# Patient Record
Sex: FEMALE | Race: BLACK OR AFRICAN AMERICAN | Employment: FULL TIME | URBAN - METROPOLITAN AREA
[De-identification: names, ages, dates, MRNs, and addresses within clinical notes are randomized per-mention and may not be internally consistent; named-entity substitution may affect disease eponyms.]

---

## 2018-10-08 ENCOUNTER — HOSPITAL ENCOUNTER (OUTPATIENT)
Age: 48
Discharge: HOME | End: 2018-10-08
Payer: MEDICAID

## 2018-10-08 VITALS
BODY MASS INDEX: 32.95 KG/M2 | SYSTOLIC BLOOD PRESSURE: 133 MMHG | DIASTOLIC BLOOD PRESSURE: 86 MMHG | HEIGHT: 66 IN | OXYGEN SATURATION: 98 % | HEART RATE: 72 BPM | WEIGHT: 205 LBS | TEMPERATURE: 98 F | RESPIRATION RATE: 18 BRPM

## 2018-10-08 DIAGNOSIS — T78.40XA ALLERGIC REACTION, INITIAL ENCOUNTER: ICD-10-CM

## 2018-10-08 DIAGNOSIS — B02.9 HERPES ZOSTER WITHOUT COMPLICATION: Primary | ICD-10-CM

## 2018-10-08 PROCEDURE — 99203 OFFICE O/P NEW LOW 30 MIN: CPT

## 2018-10-08 RX ORDER — METHYLPREDNISOLONE 4 MG/1
TABLET ORAL
Qty: 1 PACKAGE | Refills: 0 | Status: SHIPPED | OUTPATIENT
Start: 2018-10-08

## 2018-10-08 RX ORDER — VALACYCLOVIR HYDROCHLORIDE 1 G/1
1 TABLET, FILM COATED ORAL 3 TIMES DAILY
Qty: 21 TABLET | Refills: 0 | Status: SHIPPED | OUTPATIENT
Start: 2018-10-08 | End: 2018-10-15

## 2018-10-08 NOTE — ED PROVIDER NOTES
Patient Seen in: THE [facility]mediate Care In [facility] END    History   Patient presents with:  Rash    Stated Complaint: RASH    HPI    78-year-old female who comes in today complaining of a rash to the right upper chest and one section of her forearm that s Neurological: She is alert and oriented to person, place, and time. She has normal reflexes. She exhibits normal muscle tone. Coordination normal.   Skin: Skin is warm and dry. Rash noted. Rash is vesicular. She is not diaphoretic. No erythema. No pallor. I have given the patient instructions regarding her diagnosis, expectations, follow up, and return to the ER precautions.   I explained to the patient that emergent conditions may arise to return to the immediate care or ER for new, worsening or any persist

## 2019-04-16 ENCOUNTER — HOSPITAL ENCOUNTER (OUTPATIENT)
Age: 49
Discharge: HOME | End: 2019-04-16
Payer: MEDICAID

## 2019-04-16 VITALS
TEMPERATURE: 98 F | SYSTOLIC BLOOD PRESSURE: 137 MMHG | RESPIRATION RATE: 16 BRPM | HEART RATE: 51 BPM | OXYGEN SATURATION: 100 % | DIASTOLIC BLOOD PRESSURE: 74 MMHG

## 2019-04-16 DIAGNOSIS — K08.89 PAIN, DENTAL: ICD-10-CM

## 2019-04-16 DIAGNOSIS — K04.7 DENTAL ABSCESS: Primary | ICD-10-CM

## 2019-04-16 PROCEDURE — 99213 OFFICE O/P EST LOW 20 MIN: CPT

## 2019-04-16 PROCEDURE — 64400 NJX AA&/STRD TRIGEMINAL NRV: CPT

## 2019-04-16 PROCEDURE — 99214 OFFICE O/P EST MOD 30 MIN: CPT

## 2019-04-16 RX ORDER — IBUPROFEN 600 MG/1
600 TABLET ORAL EVERY 8 HOURS PRN
Qty: 30 TABLET | Refills: 0 | Status: SHIPPED | OUTPATIENT
Start: 2019-04-16 | End: 2019-04-23

## 2019-04-16 RX ORDER — HYDROCODONE BITARTRATE AND ACETAMINOPHEN 5; 325 MG/1; MG/1
1-2 TABLET ORAL EVERY 6 HOURS PRN
Qty: 10 TABLET | Refills: 0 | Status: SHIPPED | OUTPATIENT
Start: 2019-04-16 | End: 2019-04-23

## 2019-04-16 RX ORDER — PENICILLIN V POTASSIUM 500 MG/1
500 TABLET ORAL 4 TIMES DAILY
Qty: 40 TABLET | Refills: 0 | Status: SHIPPED | OUTPATIENT
Start: 2019-04-16 | End: 2019-04-26

## 2019-04-16 RX ORDER — COVID-19 ANTIGEN TEST
220 KIT MISCELLANEOUS
COMMUNITY

## 2019-04-16 NOTE — ED PROVIDER NOTES
Patient Seen in: [facility] In [facility]    History   Patient presents with:  Dental Problem (dental)    Stated Complaint: Tooth pain    HPI  CHIEF COMPLAINT: Right-sided dental pain    HISTORY OF PRESENT ILLNESS: Patient is a 51-year-old fe Current:/74   Pulse 51   Temp 98.4 °F (36.9 °C) (Temporal)   Resp 16   LMP 04/02/2019 (Approximate)   SpO2 100%   Breastfeeding? No         Physical Exam   Constitutional: She is oriented to person, place, and time.  She appears well-developed and wel Patient was screened and evaluated during this visit. I determined, within reasonable clinical confidence and prior to discharge, that an emergency medical condition was not or was no longer present.   There was no indication for further evaluation, treat

## 2019-04-16 NOTE — ED INITIAL ASSESSMENT (HPI)
Patient presents with tooth pain to rear right far molar x 2 days. Had filling 4 yrs ago. No fever or chills noted. Difficult to chew. Has dentist appt this Thursday(4/18/19)+Nerve sensitivity.